# Patient Record
Sex: FEMALE | Race: WHITE | NOT HISPANIC OR LATINO | Employment: OTHER | URBAN - NONMETROPOLITAN AREA
[De-identification: names, ages, dates, MRNs, and addresses within clinical notes are randomized per-mention and may not be internally consistent; named-entity substitution may affect disease eponyms.]

---

## 2018-08-28 ENCOUNTER — CONSULT (OUTPATIENT)
Dept: CARDIOLOGY | Facility: CLINIC | Age: 63
End: 2018-08-28

## 2018-08-28 VITALS
HEIGHT: 68 IN | SYSTOLIC BLOOD PRESSURE: 154 MMHG | BODY MASS INDEX: 24.25 KG/M2 | DIASTOLIC BLOOD PRESSURE: 98 MMHG | WEIGHT: 160 LBS | HEART RATE: 62 BPM

## 2018-08-28 DIAGNOSIS — E78.00 HYPERCHOLESTEREMIA: ICD-10-CM

## 2018-08-28 DIAGNOSIS — R07.89 CHEST PRESSURE: Primary | ICD-10-CM

## 2018-08-28 DIAGNOSIS — I10 ESSENTIAL HYPERTENSION: ICD-10-CM

## 2018-08-28 DIAGNOSIS — R01.1 MURMUR, CARDIAC: ICD-10-CM

## 2018-08-28 DIAGNOSIS — R06.02 SHORTNESS OF BREATH: ICD-10-CM

## 2018-08-28 PROCEDURE — 99204 OFFICE O/P NEW MOD 45 MIN: CPT | Performed by: INTERNAL MEDICINE

## 2018-08-28 PROCEDURE — 93000 ELECTROCARDIOGRAM COMPLETE: CPT | Performed by: INTERNAL MEDICINE

## 2018-08-28 RX ORDER — VITAMIN E 268 MG
400 CAPSULE ORAL DAILY
COMMUNITY
End: 2022-07-08

## 2018-08-28 RX ORDER — OMEPRAZOLE 40 MG/1
40 CAPSULE, DELAYED RELEASE ORAL DAILY
COMMUNITY
End: 2022-03-09

## 2018-08-28 RX ORDER — LISINOPRIL 5 MG/1
5 TABLET ORAL DAILY
Qty: 30 TABLET | Refills: 11 | Status: SHIPPED | OUTPATIENT
Start: 2018-08-28 | End: 2018-10-02 | Stop reason: DRUGHIGH

## 2018-08-28 RX ORDER — ATENOLOL 50 MG/1
50 TABLET ORAL DAILY
COMMUNITY
End: 2022-04-19 | Stop reason: SDUPTHER

## 2018-08-28 NOTE — PROGRESS NOTES
CARDIAC COMPLAINTS  chest pressure/discomfort, dyspnea and fatigue      Subjective   Mila Hooper is a 62 y.o. female came in today for her follow-up visit.  She has history of hypertension, hypercholesterolemia who was seen here at our office almost 10 years ago.  Her cardiac workup done at that time showed hypertensive blood pressure response, normal LV systolic function, mild to moderate mitral regurgitation and mild pulmonary hypertension.  I have not seen her since then.  Recently she is under a lot of stress.  Her  has diffuse ischemic heart disease and [facility] and she is worried about him.  She started noticing chest pressure-like feeling in the middle part of the chest which radiates up to her neck.  It occurs mostly on exertion and relieved by taking rest.  It is associated with shortness of breath.  She denies having any palpitation, dizziness or loss of consciousness.  She used to be on Zetia for her cholesterol.  She is not sure when it was stopped.  She apparently had labs done at your office recently.  She is not a smoker.  Her father  of an aneurysm and her mother  secondary to stroke and bradycardia.    Past Surgical History:   Procedure Laterality Date   • CARDIOVASCULAR STRESS TEST  2008    9 Min. 11.0 METS. 86% THR. /96. ST depression. Negative by nuclear images.   • ECHO - CONVERTED  04/10/2008    EF 60%. Mild- Mod MR. RVSP 37 mmHg.       Current Outpatient Prescriptions   Medication Sig Dispense Refill   • atenolol (TENORMIN) 50 MG tablet Take 50 mg by mouth Daily.     • Doxylamine Succinate, Sleep, (SLEEP AID PO) Take  by mouth Every Night.     • KRILL OIL PO Take  by mouth Daily.     • omeprazole (priLOSEC) 40 MG capsule Take 40 mg by mouth Daily.     • vitamin E 400 UNIT capsule Take 400 Units by mouth Daily.     • aspirin 81 MG tablet Take 1 tablet by mouth Daily. 30 tablet 11   • lisinopril (PRINIVIL,ZESTRIL) 5 MG tablet Take 1 tablet by mouth  "Daily. 30 tablet 11     No current facility-administered medications for this visit.            ALLERGIES:  Patient has no known allergies.    Past Medical History:   Diagnosis Date   • Abnormal ECG    • Anxiety and depression    • GERD (gastroesophageal reflux disease)    • History of cosmetic surgery    • History of hand surgery    • Hypertension        History   Smoking Status   • Never Smoker   Smokeless Tobacco   • Never Used          Family History   Problem Relation Age of Onset   • Stroke Mother    • Arrhythmia Mother    • Bradycardia Mother         s/p PPM   • Aneurysm Father        Review of Systems   Constitution: Positive for malaise/fatigue. Negative for decreased appetite.   HENT: Negative for congestion and sore throat.    Eyes: Negative for blurred vision.   Cardiovascular: Positive for chest pain and dyspnea on exertion.   Respiratory: Positive for shortness of breath. Negative for snoring.    Endocrine: Negative for cold intolerance and heat intolerance.   Hematologic/Lymphatic: Negative for adenopathy. Does not bruise/bleed easily.   Skin: Negative for itching, nail changes and skin cancer.   Musculoskeletal: Negative for arthritis and myalgias.   Gastrointestinal: Negative for abdominal pain, dysphagia and heartburn.   Genitourinary: Negative for bladder incontinence and frequency.   Neurological: Negative for dizziness, light-headedness, seizures and vertigo.   Psychiatric/Behavioral: Positive for depression. Negative for altered mental status.   Allergic/Immunologic: Negative for environmental allergies and hives.       Diabetes- No  Thyroid- normal    Objective     /98 (BP Location: Right arm)   Pulse 62   Ht 172.7 cm (68\")   Wt 72.6 kg (160 lb)   BMI 24.33 kg/m²     Physical Exam   Constitutional: She is oriented to person, place, and time. She appears well-developed and well-nourished.   HENT:   Head: Normocephalic.   Nose: Nose normal.   Eyes: Pupils are equal, round, and reactive " to light. EOM are normal.   Neck: Normal range of motion. Neck supple.   Cardiovascular: Normal rate, regular rhythm, S1 normal and S2 normal.    Murmur heard.  Pulmonary/Chest: Effort normal and breath sounds normal.   Abdominal: Soft. Bowel sounds are normal.   Musculoskeletal: Normal range of motion. She exhibits no edema.   Neurological: She is alert and oriented to person, place, and time.   Skin: Skin is warm and dry.   Psychiatric: She has a normal mood and affect.         ECG 12 Lead  Date/Time: 8/28/2018 1:00 PM  Performed by: DARRELL HERMOSILLO  Authorized by: DARRELL HERMOSILLO   Previous ECG: no previous ECG available  Rhythm: sinus rhythm  Rate: normal  QRS axis: normal  Clinical impression: non-specific ECG              Assessment/Plan   Patient's Body mass index is 24.33 kg/m². BMI is within normal parameters. No follow-up required.     Mila was seen today for establish care, chest pain, cardiac history, clotting disorder, labs and aspirin.    Diagnoses and all orders for this visit:    Chest pressure  -     Stress Test With Myocardial Perfusion One Day; Future    Essential hypertension  -     lisinopril (PRINIVIL,ZESTRIL) 5 MG tablet; Take 1 tablet by mouth Daily.    Hypercholesteremia  -     Stress Test With Myocardial Perfusion One Day; Future    Murmur, cardiac  -     Adult Transthoracic Echo Complete W/ Cont if Necessary Per Protocol; Future    Shortness of breath  -     Adult Transthoracic Echo Complete W/ Cont if Necessary Per Protocol; Future       At baseline, her heart rate is stable but the blood pressure is moderately elevated.  Her EKG showed normal sinus rhythm with ST depression in the lateral leads.  Her clinical examination reveals loud second heart sound and systolic murmur at the mitral area.  Had a long talk with her about her diet.  Talked to her about cutting down on the salt intake.  I started her on low dose of ace inhibitors in the form of Zestril 5 mg once a day.  I  scheduled her to undergo an echocardiogram to reevaluate the LV function, valvular structures and the PA pressure.  She also need a stress test to evaluate her functional status, chronotropic response, blood pressure response and also to rule out stress-induced ischemia.  Based on the results, further recommendations will be made.             Electronically signed by Sury Lopez MD August 28, 2018 12:59 PM

## 2018-09-13 ENCOUNTER — HOSPITAL ENCOUNTER (OUTPATIENT)
Dept: CARDIOLOGY | Facility: HOSPITAL | Age: 63
Discharge: HOME | End: 2018-09-13
Attending: INTERNAL MEDICINE

## 2018-09-13 DIAGNOSIS — R07.89 CHEST PRESSURE: ICD-10-CM

## 2018-09-13 DIAGNOSIS — E78.00 HYPERCHOLESTEREMIA: ICD-10-CM

## 2018-09-13 DIAGNOSIS — R01.1 MURMUR, CARDIAC: ICD-10-CM

## 2018-09-13 DIAGNOSIS — R06.02 SHORTNESS OF BREATH: ICD-10-CM

## 2018-09-13 LAB
MAXIMAL PREDICTED HEART RATE: 157 BPM
MAXIMAL PREDICTED HEART RATE: 157 BPM
STRESS TARGET HR: 133 BPM
STRESS TARGET HR: 133 BPM

## 2018-09-13 PROCEDURE — 78452 HT MUSCLE IMAGE SPECT MULT: CPT

## 2018-09-13 PROCEDURE — 93018 CV STRESS TEST I&R ONLY: CPT | Performed by: INTERNAL MEDICINE

## 2018-09-13 PROCEDURE — A9500 TC99M SESTAMIBI: HCPCS | Performed by: INTERNAL MEDICINE

## 2018-09-13 PROCEDURE — 93017 CV STRESS TEST TRACING ONLY: CPT

## 2018-09-13 PROCEDURE — 0 TECHNETIUM SESTAMIBI: Performed by: INTERNAL MEDICINE

## 2018-09-13 PROCEDURE — 93306 TTE W/DOPPLER COMPLETE: CPT

## 2018-09-13 PROCEDURE — 78452 HT MUSCLE IMAGE SPECT MULT: CPT | Performed by: INTERNAL MEDICINE

## 2018-09-13 PROCEDURE — 93306 TTE W/DOPPLER COMPLETE: CPT | Performed by: INTERNAL MEDICINE

## 2018-09-13 RX ADMIN — TECHNETIUM TC 99M SESTAMIBI 1 DOSE: 1 INJECTION INTRAVENOUS at 09:45

## 2018-09-13 RX ADMIN — TECHNETIUM TC 99M SESTAMIBI 1 DOSE: 1 INJECTION INTRAVENOUS at 08:17

## 2018-09-17 ENCOUNTER — TELEPHONE (OUTPATIENT)
Dept: CARDIOLOGY | Facility: CLINIC | Age: 63
End: 2018-09-17

## 2018-09-17 NOTE — TELEPHONE ENCOUNTER
Patient aware of stress test and echo results and recommendations.  No ischemia, normal LV function, mild to moderate MR.  Continue home medications.

## 2018-09-28 ENCOUNTER — TELEPHONE (OUTPATIENT)
Dept: CARDIOLOGY | Facility: CLINIC | Age: 63
End: 2018-09-28

## 2018-09-28 NOTE — TELEPHONE ENCOUNTER
Pt has been having problems with BP, still running around 150's/ pulse in 80-90's. Meds as listed in chart.

## 2018-09-28 NOTE — TELEPHONE ENCOUNTER
After reviewing her chart.  I do not see any record of her heart rate being in the 80 or 90.  She was baseline 66 on stress and 62 at consult. SSS runs in her family.  For now, increase lisinopril to 10mg daily and continue to monitor.

## 2018-10-02 ENCOUNTER — TELEPHONE (OUTPATIENT)
Dept: CARDIOLOGY | Facility: CLINIC | Age: 63
End: 2018-10-02

## 2018-10-02 RX ORDER — LISINOPRIL 10 MG/1
10 TABLET ORAL DAILY
Qty: 90 TABLET | Refills: 3 | Status: SHIPPED | OUTPATIENT
Start: 2018-10-02 | End: 2022-03-09

## 2020-07-30 ENCOUNTER — LAB (OUTPATIENT)
Dept: LAB | Facility: HOSPITAL | Age: 65
End: 2020-07-30

## 2020-07-30 ENCOUNTER — HOSPITAL ENCOUNTER (OUTPATIENT)
Dept: GENERAL RADIOLOGY | Facility: HOSPITAL | Age: 65
Discharge: HOME | End: 2020-07-30
Admitting: ORTHOPAEDIC SURGERY

## 2020-07-30 ENCOUNTER — TRANSCRIBE ORDERS (OUTPATIENT)
Dept: LAB | Facility: HOSPITAL | Age: 65
End: 2020-07-30

## 2020-07-30 ENCOUNTER — TRANSCRIBE ORDERS (OUTPATIENT)
Dept: ADMINISTRATIVE | Facility: HOSPITAL | Age: 65
End: 2020-07-30

## 2020-07-30 DIAGNOSIS — Z01.811 PRE-OP CHEST EXAM: ICD-10-CM

## 2020-07-30 DIAGNOSIS — Z01.811 PRE-OP CHEST EXAM: Primary | ICD-10-CM

## 2020-07-30 DIAGNOSIS — Z01.818 OTHER SPECIFIED PRE-OPERATIVE EXAMINATION: Primary | ICD-10-CM

## 2020-07-30 DIAGNOSIS — Z01.818 OTHER SPECIFIED PRE-OPERATIVE EXAMINATION: ICD-10-CM

## 2020-07-30 LAB
ANION GAP SERPL CALCULATED.3IONS-SCNC: 15.7 MMOL/L (ref 5–15)
BUN SERPL-MCNC: 23 MG/DL (ref 8–23)
BUN/CREAT SERPL: 21.9 (ref 7–25)
CALCIUM SPEC-SCNC: 10.1 MG/DL (ref 8.6–10.5)
CHLORIDE SERPL-SCNC: 97 MMOL/L (ref 98–107)
CO2 SERPL-SCNC: 25.3 MMOL/L (ref 22–29)
CREAT SERPL-MCNC: 1.05 MG/DL (ref 0.57–1)
DEPRECATED RDW RBC AUTO: 45 FL (ref 37–54)
ERYTHROCYTE [DISTWIDTH] IN BLOOD BY AUTOMATED COUNT: 12.5 % (ref 12.3–15.4)
GFR SERPL CREATININE-BSD FRML MDRD: 53 ML/MIN/1.73
GLUCOSE SERPL-MCNC: 79 MG/DL (ref 65–99)
HBA1C MFR BLD: 4.85 % (ref 4.8–5.6)
HCT VFR BLD AUTO: 44.6 % (ref 34–46.6)
HGB BLD-MCNC: 15.6 G/DL (ref 12–15.9)
MCH RBC QN AUTO: 34.1 PG (ref 26.6–33)
MCHC RBC AUTO-ENTMCNC: 35 G/DL (ref 31.5–35.7)
MCV RBC AUTO: 97.6 FL (ref 79–97)
PLATELET # BLD AUTO: 211 10*3/MM3 (ref 140–450)
PMV BLD AUTO: 10.5 FL (ref 6–12)
POTASSIUM SERPL-SCNC: 3.5 MMOL/L (ref 3.5–5.2)
RBC # BLD AUTO: 4.57 10*6/MM3 (ref 3.77–5.28)
SODIUM SERPL-SCNC: 138 MMOL/L (ref 136–145)
WBC # BLD AUTO: 7.52 10*3/MM3 (ref 3.4–10.8)

## 2020-07-30 PROCEDURE — 93010 ELECTROCARDIOGRAM REPORT: CPT | Performed by: INTERNAL MEDICINE

## 2020-07-30 PROCEDURE — 36415 COLL VENOUS BLD VENIPUNCTURE: CPT | Performed by: ORTHOPAEDIC SURGERY

## 2020-07-30 PROCEDURE — 71046 X-RAY EXAM CHEST 2 VIEWS: CPT

## 2020-07-30 PROCEDURE — 80048 BASIC METABOLIC PNL TOTAL CA: CPT

## 2020-07-30 PROCEDURE — 93005 ELECTROCARDIOGRAM TRACING: CPT | Performed by: ORTHOPAEDIC SURGERY

## 2020-07-30 PROCEDURE — 83036 HEMOGLOBIN GLYCOSYLATED A1C: CPT | Performed by: ORTHOPAEDIC SURGERY

## 2020-07-30 PROCEDURE — 85027 COMPLETE CBC AUTOMATED: CPT | Performed by: ORTHOPAEDIC SURGERY

## 2020-08-10 ENCOUNTER — APPOINTMENT (OUTPATIENT)
Dept: PREADMISSION TESTING | Facility: HOSPITAL | Age: 65
End: 2020-08-10

## 2020-08-10 PROCEDURE — U0002 COVID-19 LAB TEST NON-CDC: HCPCS

## 2020-08-10 PROCEDURE — C9803 HOPD COVID-19 SPEC COLLECT: HCPCS

## 2020-08-10 PROCEDURE — U0004 COV-19 TEST NON-CDC HGH THRU: HCPCS

## 2020-08-11 LAB
REF LAB TEST METHOD: NORMAL
SARS-COV-2 RNA RESP QL NAA+PROBE: NOT DETECTED

## 2022-03-09 ENCOUNTER — OFFICE VISIT (OUTPATIENT)
Dept: CARDIOLOGY | Facility: CLINIC | Age: 67
End: 2022-03-09

## 2022-03-09 VITALS
SYSTOLIC BLOOD PRESSURE: 163 MMHG | WEIGHT: 166.4 LBS | HEART RATE: 70 BPM | DIASTOLIC BLOOD PRESSURE: 88 MMHG | BODY MASS INDEX: 26.12 KG/M2 | HEIGHT: 67 IN | OXYGEN SATURATION: 98 %

## 2022-03-09 DIAGNOSIS — R10.13 EPIGASTRIC PAIN: ICD-10-CM

## 2022-03-09 DIAGNOSIS — R07.89 CHEST PRESSURE: ICD-10-CM

## 2022-03-09 DIAGNOSIS — R00.2 PALPITATIONS: ICD-10-CM

## 2022-03-09 DIAGNOSIS — I10 ESSENTIAL HYPERTENSION: Primary | ICD-10-CM

## 2022-03-09 DIAGNOSIS — R06.02 SHORTNESS OF BREATH: ICD-10-CM

## 2022-03-09 DIAGNOSIS — R06.02 SOB (SHORTNESS OF BREATH): ICD-10-CM

## 2022-03-09 PROCEDURE — 93000 ELECTROCARDIOGRAM COMPLETE: CPT | Performed by: NURSE PRACTITIONER

## 2022-03-09 PROCEDURE — 99204 OFFICE O/P NEW MOD 45 MIN: CPT | Performed by: NURSE PRACTITIONER

## 2022-03-09 RX ORDER — OXYBUTYNIN CHLORIDE 5 MG/1
5 TABLET, EXTENDED RELEASE ORAL DAILY
COMMUNITY
Start: 2022-01-19

## 2022-03-09 RX ORDER — PANTOPRAZOLE SODIUM 40 MG/1
40 TABLET, DELAYED RELEASE ORAL DAILY
COMMUNITY
End: 2022-07-08

## 2022-03-09 RX ORDER — AMLODIPINE BESYLATE 5 MG/1
5 TABLET ORAL DAILY
Qty: 90 TABLET | Refills: 3 | Status: SHIPPED | OUTPATIENT
Start: 2022-03-09 | End: 2022-03-15 | Stop reason: SINTOL

## 2022-03-09 RX ORDER — IRBESARTAN AND HYDROCHLOROTHIAZIDE 300; 12.5 MG/1; MG/1
1 TABLET, FILM COATED ORAL DAILY
Qty: 30 TABLET | Refills: 0 | Status: SHIPPED | OUTPATIENT
Start: 2022-03-09 | End: 2022-03-09 | Stop reason: SDUPTHER

## 2022-03-09 RX ORDER — IRBESARTAN AND HYDROCHLOROTHIAZIDE 300; 12.5 MG/1; MG/1
1 TABLET, FILM COATED ORAL DAILY
Qty: 90 TABLET | Refills: 3 | Status: SHIPPED | OUTPATIENT
Start: 2022-03-09 | End: 2022-03-15 | Stop reason: SINTOL

## 2022-03-09 RX ORDER — NITROGLYCERIN 0.4 MG/1
TABLET SUBLINGUAL
Qty: 30 TABLET | Refills: 5 | Status: SHIPPED | OUTPATIENT
Start: 2022-03-09 | End: 2022-07-11

## 2022-03-09 RX ORDER — FAMOTIDINE 20 MG/1
20 TABLET, FILM COATED ORAL DAILY
COMMUNITY
Start: 2022-01-19

## 2022-03-09 RX ORDER — AMLODIPINE BESYLATE 5 MG/1
5 TABLET ORAL DAILY
Qty: 30 TABLET | Refills: 0 | Status: SHIPPED | OUTPATIENT
Start: 2022-03-09 | End: 2022-03-09 | Stop reason: SDUPTHER

## 2022-03-09 RX ORDER — CLONIDINE HYDROCHLORIDE 0.1 MG/1
0.1 TABLET ORAL 3 TIMES DAILY PRN
Qty: 90 TABLET | Refills: 3 | Status: SHIPPED | OUTPATIENT
Start: 2022-03-09 | End: 2022-06-06

## 2022-03-09 NOTE — PROGRESS NOTES
Subjective     Mila Hooper is a 66 y.o. female who presents to day for Chest Pain (Presents for cardiac eval), Palpitations, Hypertension, and Shortness of Breath.    CHIEF COMPLIANT  Chief Complaint   Patient presents with   • Chest Pain     Presents for cardiac eval   • Palpitations   • Hypertension   • Shortness of Breath       Active Problems:  Problem List Items Addressed This Visit        Cardiac and Vasculature    Essential hypertension - Primary    Relevant Medications    cloNIDine (Catapres) 0.1 MG tablet    amLODIPine (NORVASC) 5 MG tablet    irbesartan-hydrochlorothiazide (AVALIDE) 300-12.5 MG tablet    Other Relevant Orders    ECG 12 Lead    Stress Test With Myocardial Perfusion One Day    Adult Transthoracic Echo Complete W/ Cont if Necessary Per Protocol    US Abdomen Complete       Pulmonary and Pneumonias    Shortness of breath    Relevant Orders    ECG 12 Lead    Stress Test With Myocardial Perfusion One Day    Adult Transthoracic Echo Complete W/ Cont if Necessary Per Protocol    US Abdomen Complete       Symptoms and Signs    Chest pressure    Relevant Medications    nitroglycerin (NITROSTAT) 0.4 MG SL tablet    Other Relevant Orders    ECG 12 Lead    Stress Test With Myocardial Perfusion One Day    Adult Transthoracic Echo Complete W/ Cont if Necessary Per Protocol    US Abdomen Complete      Other Visit Diagnoses     SOB (shortness of breath)        Relevant Orders    ECG 12 Lead    Stress Test With Myocardial Perfusion One Day    Adult Transthoracic Echo Complete W/ Cont if Necessary Per Protocol    US Abdomen Complete    Palpitations        Relevant Orders    ECG 12 Lead    Stress Test With Myocardial Perfusion One Day    Adult Transthoracic Echo Complete W/ Cont if Necessary Per Protocol    US Abdomen Complete    Epigastric pain        Relevant Orders    ECG 12 Lead    US Abdomen Complete      Problem list  1.  Chest pain  2.  Hypertension  3.  Shortness of breath  4.   Palpitations  5.  COVID-19    HPI  HPI  Ms. Hooper is a 66-year-old female patient who is being seen today to establish care for cardiac evaluation for chest pain and hypertension.  Patient reports chest pain that occurs in the epigastric area and radiates to the mid sternum and through to her back.  Last episode occurred last night.  It occurs intermittently and which she describes as a dull-like sensation in her chest.  Usually last for 15 to 20 minutes per episode.  Usually does occur with activity but can occur at rest.  She also has associated added nausea.  She is not been able to identify any relieving factors.  She has been taking aspirin as needed.  She does have a sister at 53 and a brother is 72 had cardiovascular issues.  She also reports shortness of breath that occurs with activity and at rest.  She is walking a minimal distance she has to stop and catch her breath.  This is also progressed.  She also has palpitations which is intermittent fluttering and jumping and flip-flopping in her chest that occurs approximately 1-2 times a week.  She says it sort of just hits and goes away and is short-lived.  She is on atenolol 50 mg daily which seems to have helped quite a bit.  She also has fatigue where she gets tired and stays tired.  She has had COVID-19 x2.  She also has some lower extremity edema where she experiences some puffiness around her ankles.  She does have chronic arterial hypertension where she is on atenolol and lisinopril.  She is currently 163/88 with a heart rate is 70.  She says it usually quite a bit higher than that.  She does have some associated dizziness.  She denies any syncope, PND, orthopnea, or strokelike symptoms.  PRIOR MEDS  Current Outpatient Medications on File Prior to Visit   Medication Sig Dispense Refill   • aspirin 81 MG tablet Take 1 tablet by mouth Daily. 30 tablet 11   • atenolol (TENORMIN) 50 MG tablet Take 50 mg by mouth Daily.     • Doxylamine Succinate, Sleep,  (SLEEP AID PO) Take  by mouth Every Night.     • famotidine (PEPCID) 20 MG tablet Take 20 mg by mouth Daily.     • oxybutynin XL (DITROPAN-XL) 5 MG 24 hr tablet Take 5 mg by mouth Daily.     • pantoprazole (PROTONIX) 40 MG EC tablet Take 40 mg by mouth Daily.     • vitamin E 400 UNIT capsule Take 400 Units by mouth Daily.       No current facility-administered medications on file prior to visit.       ALLERGIES  Patient has no known allergies.    HISTORY  Past Medical History:   Diagnosis Date   • Abnormal ECG    • Anxiety and depression    • COVID-19     x2   • GERD (gastroesophageal reflux disease)    • History of cosmetic surgery    • History of hand surgery    • Hypertension        Social History     Socioeconomic History   • Marital status:    Tobacco Use   • Smoking status: Never Smoker   • Smokeless tobacco: Never Used   Substance and Sexual Activity   • Alcohol use: No   • Drug use: No       Family History   Problem Relation Age of Onset   • Stroke Mother    • Arrhythmia Mother    • Bradycardia Mother         s/p PPM   • Aneurysm Father        Review of Systems   Constitutional: Positive for fatigue (Covid x2). Negative for chills, diaphoresis and fever.   HENT: Negative.    Eyes: Negative.  Negative for visual disturbance.   Respiratory: Positive for shortness of breath (with activity and times at rest). Negative for apnea, cough, chest tightness and wheezing.    Cardiovascular: Positive for chest pain (pain in pit of stomach up to chest and around to back), palpitations (flutters) and leg swelling (puffiness at sock line).   Gastrointestinal: Positive for constipation. Negative for abdominal pain, blood in stool, diarrhea, nausea and vomiting.   Endocrine: Negative.    Genitourinary: Negative.  Negative for hematuria.   Musculoskeletal: Positive for arthralgias, back pain and neck pain. Negative for myalgias.   Skin: Negative.    Allergic/Immunologic: Negative.    Neurological: Positive for  "dizziness (with HTN). Negative for syncope, weakness, light-headedness, numbness and headaches.   Hematological: Negative.  Does not bruise/bleed easily.   Psychiatric/Behavioral: Positive for sleep disturbance (restless).       Objective     VITALS: /88 (BP Location: Left arm, Patient Position: Sitting)   Pulse 70   Ht 170.2 cm (67\")   Wt 75.5 kg (166 lb 6.4 oz)   SpO2 98%   BMI 26.06 kg/m²     LABS:   Lab Results (most recent)     None          IMAGING:   No Images in the past 120 days found..    EXAM:  Physical Exam  Vitals and nursing note reviewed.   Constitutional:       Appearance: She is well-developed.   HENT:      Head: Normocephalic.   Eyes:      Pupils: Pupils are equal, round, and reactive to light.   Neck:      Thyroid: No thyroid mass.      Vascular: No carotid bruit or JVD.      Trachea: Trachea and phonation normal.   Cardiovascular:      Rate and Rhythm: Normal rate and regular rhythm.      Pulses:           Radial pulses are 2+ on the right side and 2+ on the left side.      Heart sounds: Murmur heard.     No friction rub. No gallop.   Pulmonary:      Effort: Pulmonary effort is normal. No respiratory distress.      Breath sounds: Normal breath sounds. No wheezing or rales.   Abdominal:      General: Bowel sounds are normal.      Palpations: Abdomen is soft.   Musculoskeletal:         General: Normal range of motion.      Cervical back: Neck supple.   Skin:     General: Skin is warm and dry.      Capillary Refill: Capillary refill takes less than 2 seconds.      Findings: No rash.   Neurological:      Mental Status: She is alert and oriented to person, place, and time.   Psychiatric:         Speech: Speech normal.         Behavior: Behavior normal.         Thought Content: Thought content normal.         Judgment: Judgment normal.         Procedure     ECG 12 Lead    Date/Time: 3/9/2022 9:58 AM  Performed by: Ramiro Brito APRN  Authorized by: Ramiro Brito APRN   Comparison: " compared with previous ECG from 7/30/2020  Similar to previous ECG  Rate: normal  BPM: 67  QRS axis: normal  Other findings: non-specific ST-T wave changes  Comments:  ms  No acute changes               Assessment/Plan    Diagnosis Plan   1. Essential hypertension  ECG 12 Lead    cloNIDine (Catapres) 0.1 MG tablet    Stress Test With Myocardial Perfusion One Day    Adult Transthoracic Echo Complete W/ Cont if Necessary Per Protocol    US Abdomen Complete    amLODIPine (NORVASC) 5 MG tablet    irbesartan-hydrochlorothiazide (AVALIDE) 300-12.5 MG tablet   2. Shortness of breath  ECG 12 Lead    Stress Test With Myocardial Perfusion One Day    Adult Transthoracic Echo Complete W/ Cont if Necessary Per Protocol    US Abdomen Complete   3. Chest pressure  ECG 12 Lead    nitroglycerin (NITROSTAT) 0.4 MG SL tablet    Stress Test With Myocardial Perfusion One Day    Adult Transthoracic Echo Complete W/ Cont if Necessary Per Protocol    US Abdomen Complete   4. SOB (shortness of breath)  ECG 12 Lead    Stress Test With Myocardial Perfusion One Day    Adult Transthoracic Echo Complete W/ Cont if Necessary Per Protocol    US Abdomen Complete   5. Palpitations  ECG 12 Lead    Stress Test With Myocardial Perfusion One Day    Adult Transthoracic Echo Complete W/ Cont if Necessary Per Protocol    US Abdomen Complete   6. Epigastric pain  ECG 12 Lead    US Abdomen Complete   1.  Patient does have an elevated blood pressure today and a history of elevated blood pressure per her report.  She is currently on lisinopril and atenolol.  We will also start her on amlodipine to see if we can better control her blood pressure.  She will monitor her blood pressure on a routine basis report any significant highs or lows.  I would also like to switch her lisinopril to irbesartan hydrochlorothiazide.  2.  Due to patient's chest pain, shortness of breath, and continued fatigue as well as COVID-19 and heart murmur I do feel it appropriate  to have patient go under an ischemic evaluation including stress test and echocardiogram.  She does have significant comorbidities as well of hypertension, strong family history of heart disease, and nonspecific changes on her EKG.  3.  Patient does have a family history of aortic aneurysm.  Therefore I like to get an ultrasound of her abdominal aorta for evaluation due to the fact that her pain originates in the epigastric area and radiates into her back.  4.  Patient was prescribed sublingual nitroglycerin and advised he can take up to 3 doses 5 minutes apart and if pain is not relieved after the third dose go to the emergency department.  5.  Informed of signs and symptoms of ACS and advised to seek emergent treatment for any new worsening symptoms.  Patient also advised sooner follow-up as needed.  Also advised to follow-up with family doctor as needed  This note is dictated utilizing voice recognition software.  Although this record has been proof read, transcriptional errors may still be present. If questions occur regarding the content of this record please do not hesitate to call our office.  I have reviewed and confirmed the accuracy of the ROS as documented by the MA/JOSEN/RN TRACI Chand    Return in about 3 months (around 6/9/2022), or if symptoms worsen or fail to improve.    Diagnoses and all orders for this visit:    1. Essential hypertension (Primary)  -     ECG 12 Lead  -     Discontinue: irbesartan-hydrochlorothiazide (AVALIDE) 300-12.5 MG tablet; Take 1 tablet by mouth Daily.  Dispense: 30 tablet; Refill: 0  -     Discontinue: amLODIPine (NORVASC) 5 MG tablet; Take 1 tablet by mouth Daily.  Dispense: 30 tablet; Refill: 0  -     cloNIDine (Catapres) 0.1 MG tablet; Take 1 tablet by mouth 3 (Three) Times a Day As Needed for High Blood Pressure (>160/90).  Dispense: 90 tablet; Refill: 3  -     Stress Test With Myocardial Perfusion One Day; Future  -     Adult Transthoracic Echo Complete W/ Cont  if Necessary Per Protocol; Future  -     US Abdomen Complete; Future  -     amLODIPine (NORVASC) 5 MG tablet; Take 1 tablet by mouth Daily.  Dispense: 90 tablet; Refill: 3  -     irbesartan-hydrochlorothiazide (AVALIDE) 300-12.5 MG tablet; Take 1 tablet by mouth Daily.  Dispense: 90 tablet; Refill: 3    2. Shortness of breath  -     ECG 12 Lead  -     Stress Test With Myocardial Perfusion One Day; Future  -     Adult Transthoracic Echo Complete W/ Cont if Necessary Per Protocol; Future  -     US Abdomen Complete; Future    3. Chest pressure  -     ECG 12 Lead  -     nitroglycerin (NITROSTAT) 0.4 MG SL tablet; 1 under the tongue as needed for angina, may repeat q5mins for up three doses  Dispense: 30 tablet; Refill: 5  -     Stress Test With Myocardial Perfusion One Day; Future  -     Adult Transthoracic Echo Complete W/ Cont if Necessary Per Protocol; Future  -     US Abdomen Complete; Future    4. SOB (shortness of breath)  -     ECG 12 Lead  -     Stress Test With Myocardial Perfusion One Day; Future  -     Adult Transthoracic Echo Complete W/ Cont if Necessary Per Protocol; Future  -     US Abdomen Complete; Future    5. Palpitations  -     ECG 12 Lead  -     Stress Test With Myocardial Perfusion One Day; Future  -     Adult Transthoracic Echo Complete W/ Cont if Necessary Per Protocol; Future  -     US Abdomen Complete; Future    6. Epigastric pain  -     ECG 12 Lead  -     US Abdomen Complete; Future          Advance Care Planning   ACP discussion was held with the patient during this visit. Patient has an advance directive (not in EMR), copy requested.       MEDS ORDERED DURING VISIT:  New Medications Ordered This Visit   Medications   • nitroglycerin (NITROSTAT) 0.4 MG SL tablet     Si under the tongue as needed for angina, may repeat q5mins for up three doses     Dispense:  30 tablet     Refill:  5   • cloNIDine (Catapres) 0.1 MG tablet     Sig: Take 1 tablet by mouth 3 (Three) Times a Day As Needed for  High Blood Pressure (>160/90).     Dispense:  90 tablet     Refill:  3   • amLODIPine (NORVASC) 5 MG tablet     Sig: Take 1 tablet by mouth Daily.     Dispense:  90 tablet     Refill:  3   • irbesartan-hydrochlorothiazide (AVALIDE) 300-12.5 MG tablet     Sig: Take 1 tablet by mouth Daily.     Dispense:  90 tablet     Refill:  3           This document has been electronically signed by Ramiro Brito Jr., APRN  March 11, 2022 07:55 EST

## 2022-03-15 ENCOUNTER — CLINICAL SUPPORT (OUTPATIENT)
Dept: CARDIOLOGY | Facility: CLINIC | Age: 67
End: 2022-03-15

## 2022-03-15 VITALS
WEIGHT: 161 LBS | OXYGEN SATURATION: 99 % | DIASTOLIC BLOOD PRESSURE: 84 MMHG | SYSTOLIC BLOOD PRESSURE: 132 MMHG | HEIGHT: 67 IN | BODY MASS INDEX: 25.27 KG/M2 | HEART RATE: 60 BPM

## 2022-03-15 DIAGNOSIS — R10.13 EPIGASTRIC PAIN: ICD-10-CM

## 2022-03-15 DIAGNOSIS — I10 ESSENTIAL HYPERTENSION: ICD-10-CM

## 2022-03-15 DIAGNOSIS — R07.89 CHEST PRESSURE: ICD-10-CM

## 2022-03-15 DIAGNOSIS — I10 ESSENTIAL HYPERTENSION: Primary | ICD-10-CM

## 2022-03-15 DIAGNOSIS — R10.13 EPIGASTRIC PAIN: Primary | ICD-10-CM

## 2022-03-15 DIAGNOSIS — Z82.49 FAMILY HISTORY OF ABDOMINAL AORTIC ANEURYSM (AAA): ICD-10-CM

## 2022-03-15 DIAGNOSIS — R00.2 PALPITATIONS: ICD-10-CM

## 2022-03-15 DIAGNOSIS — Z82.49 FAMILY HISTORY OF ABDOMINAL AORTIC ANEURYSM: ICD-10-CM

## 2022-03-15 PROCEDURE — 99214 OFFICE O/P EST MOD 30 MIN: CPT | Performed by: NURSE PRACTITIONER

## 2022-03-15 RX ORDER — ISOSORBIDE MONONITRATE 30 MG/1
30 TABLET, EXTENDED RELEASE ORAL DAILY
Qty: 30 TABLET | Refills: 11 | Status: SHIPPED | OUTPATIENT
Start: 2022-03-15 | End: 2022-06-20

## 2022-03-15 RX ORDER — ISOSORBIDE MONONITRATE 30 MG/1
30 TABLET, EXTENDED RELEASE ORAL DAILY
Qty: 30 TABLET | Refills: 11 | Status: SHIPPED | OUTPATIENT
Start: 2022-03-15 | End: 2022-03-15

## 2022-03-15 NOTE — PROGRESS NOTES
Mila Hooper  1955  3/15/2022   ?   Chief Complaint   Patient presents with   • Chest Pain   • Fatigue     presyncope   • Shortness of Breath      ?   HPI:   ?Patient presents with chest pain,fatigue, and presyncope. She said that her hear rate was as high as 145 on Sunday.     ?   ?     Current Outpatient Medications:   •  aspirin 81 MG tablet, Take 1 tablet by mouth Daily., Disp: 30 tablet, Rfl: 11  •  atenolol (TENORMIN) 50 MG tablet, Take 50 mg by mouth Daily., Disp: , Rfl:   •  Doxylamine Succinate, Sleep, (SLEEP AID PO), Take  by mouth Every Night., Disp: , Rfl:   •  famotidine (PEPCID) 20 MG tablet, Take 20 mg by mouth Daily., Disp: , Rfl:   •  nitroglycerin (NITROSTAT) 0.4 MG SL tablet, 1 under the tongue as needed for angina, may repeat q5mins for up three doses, Disp: 30 tablet, Rfl: 5  •  oxybutynin XL (DITROPAN-XL) 5 MG 24 hr tablet, Take 5 mg by mouth Daily., Disp: , Rfl:   •  pantoprazole (PROTONIX) 40 MG EC tablet, Take 40 mg by mouth Daily., Disp: , Rfl:   •  vitamin E 400 UNIT capsule, Take 400 Units by mouth Daily., Disp: , Rfl:   •  amLODIPine (NORVASC) 5 MG tablet, Take 1 tablet by mouth Daily., Disp: 90 tablet, Rfl: 3  •  cloNIDine (Catapres) 0.1 MG tablet, Take 1 tablet by mouth 3 (Three) Times a Day As Needed for High Blood Pressure (>160/90)., Disp: 90 tablet, Rfl: 3  •  irbesartan-hydrochlorothiazide (AVALIDE) 300-12.5 MG tablet, Take 1 tablet by mouth Daily., Disp: 90 tablet, Rfl: 3   ?   ?   Patient has no known allergies.       Procedures     ?   Assessment/Plan    ?   ?   ?

## 2022-03-24 ENCOUNTER — HOSPITAL ENCOUNTER (OUTPATIENT)
Dept: CARDIOLOGY | Facility: HOSPITAL | Age: 67
Discharge: HOME | End: 2022-03-24

## 2022-03-24 DIAGNOSIS — Z82.49 FAMILY HISTORY OF ABDOMINAL AORTIC ANEURYSM: ICD-10-CM

## 2022-03-24 DIAGNOSIS — R10.13 EPIGASTRIC PAIN: ICD-10-CM

## 2022-03-24 DIAGNOSIS — R07.89 CHEST PRESSURE: ICD-10-CM

## 2022-03-24 DIAGNOSIS — R06.02 SOB (SHORTNESS OF BREATH): ICD-10-CM

## 2022-03-24 DIAGNOSIS — I10 ESSENTIAL HYPERTENSION: ICD-10-CM

## 2022-03-24 DIAGNOSIS — R06.02 SHORTNESS OF BREATH: ICD-10-CM

## 2022-03-24 DIAGNOSIS — Z82.49 FAMILY HISTORY OF ABDOMINAL AORTIC ANEURYSM (AAA): ICD-10-CM

## 2022-03-24 DIAGNOSIS — R00.2 PALPITATIONS: ICD-10-CM

## 2022-03-24 LAB
BH CV ECHO MEAS - DIST AO DIAM: 1.7 CM
BH CV XLRA MEAS - MID AO DIAM: 2 CM
BH CV XLRA MEAS - PROX AO DIAM: 2.3 CM

## 2022-03-24 PROCEDURE — 93975 VASCULAR STUDY: CPT

## 2022-03-24 PROCEDURE — 93306 TTE W/DOPPLER COMPLETE: CPT

## 2022-03-24 PROCEDURE — A9500 TC99M SESTAMIBI: HCPCS | Performed by: INTERNAL MEDICINE

## 2022-03-24 PROCEDURE — 93975 VASCULAR STUDY: CPT | Performed by: INTERNAL MEDICINE

## 2022-03-24 PROCEDURE — 93799 UNLISTED CV SVC/PROCEDURE: CPT

## 2022-03-24 PROCEDURE — 93017 CV STRESS TEST TRACING ONLY: CPT

## 2022-03-24 PROCEDURE — 93306 TTE W/DOPPLER COMPLETE: CPT | Performed by: INTERNAL MEDICINE

## 2022-03-24 PROCEDURE — 0 TECHNETIUM SESTAMIBI: Performed by: INTERNAL MEDICINE

## 2022-03-24 PROCEDURE — 93018 CV STRESS TEST I&R ONLY: CPT | Performed by: INTERNAL MEDICINE

## 2022-03-24 PROCEDURE — 78452 HT MUSCLE IMAGE SPECT MULT: CPT | Performed by: INTERNAL MEDICINE

## 2022-03-24 PROCEDURE — 78452 HT MUSCLE IMAGE SPECT MULT: CPT

## 2022-03-24 RX ADMIN — TECHNETIUM TC 99M SESTAMIBI 1 DOSE: 1 INJECTION INTRAVENOUS at 10:07

## 2022-03-24 RX ADMIN — TECHNETIUM TC 99M SESTAMIBI 1 DOSE: 1 INJECTION INTRAVENOUS at 13:41

## 2022-03-25 ENCOUNTER — TELEPHONE (OUTPATIENT)
Dept: CARDIOLOGY | Facility: CLINIC | Age: 67
End: 2022-03-25

## 2022-03-25 ENCOUNTER — APPOINTMENT (OUTPATIENT)
Dept: CARDIOLOGY | Facility: HOSPITAL | Age: 67
End: 2022-03-25

## 2022-03-25 DIAGNOSIS — I77.810 DILATED AORTIC ROOT: Primary | ICD-10-CM

## 2022-03-25 DIAGNOSIS — I77.810 DILATED AORTIC ROOT: ICD-10-CM

## 2022-03-25 LAB
BH CV ECHO MEAS - ACS: 2.18 CM
BH CV ECHO MEAS - AO MAX PG: 4.1 MMHG
BH CV ECHO MEAS - AO MEAN PG: 2.31 MMHG
BH CV ECHO MEAS - AO ROOT DIAM: 3.2 CM
BH CV ECHO MEAS - AO V2 MAX: 101.8 CM/SEC
BH CV ECHO MEAS - AO V2 VTI: 25.9 CM
BH CV ECHO MEAS - EDV(CUBED): 61.2 ML
BH CV ECHO MEAS - EDV(MOD-SP4): 75.2 ML
BH CV ECHO MEAS - EF(MOD-SP4): 53.3 %
BH CV ECHO MEAS - EF_3D-VOL: 59 %
BH CV ECHO MEAS - ESV(CUBED): 8.9 ML
BH CV ECHO MEAS - ESV(MOD-SP4): 35.1 ML
BH CV ECHO MEAS - FS: 47.5 %
BH CV ECHO MEAS - IVS/LVPW: 1.69 CM
BH CV ECHO MEAS - IVSD: 1.84 CM
BH CV ECHO MEAS - LA DIMENSION: 4.2 CM
BH CV ECHO MEAS - LAT PEAK E' VEL: 7.4 CM/SEC
BH CV ECHO MEAS - LV DIASTOLIC VOL/BSA (35-75): 40.8 CM2
BH CV ECHO MEAS - LV MASS(C)D: 219.5 GRAMS
BH CV ECHO MEAS - LV SYSTOLIC VOL/BSA (12-30): 19 CM2
BH CV ECHO MEAS - LVIDD: 3.9 CM
BH CV ECHO MEAS - LVIDS: 2.07 CM
BH CV ECHO MEAS - LVOT AREA: 2.8 CM2
BH CV ECHO MEAS - LVOT DIAM: 1.9 CM
BH CV ECHO MEAS - LVPWD: 1.09 CM
BH CV ECHO MEAS - MED PEAK E' VEL: 4.9 CM/SEC
BH CV ECHO MEAS - MV A MAX VEL: 58.7 CM/SEC
BH CV ECHO MEAS - MV DEC SLOPE: 233.5 CM/SEC2
BH CV ECHO MEAS - MV E MAX VEL: 65.6 CM/SEC
BH CV ECHO MEAS - MV E/A: 1.12
BH CV ECHO MEAS - RAP SYSTOLE: 10 MMHG
BH CV ECHO MEAS - RVDD: 3.4 CM
BH CV ECHO MEAS - RVSP: 31 MMHG
BH CV ECHO MEAS - SI(MOD-SP4): 21.7 ML/M2
BH CV ECHO MEAS - SV(MOD-SP4): 40.1 ML
BH CV ECHO MEAS - TR MAX PG: 21 MMHG
BH CV ECHO MEAS - TR MAX VEL: 229.4 CM/SEC
BH CV ECHO MEASUREMENTS AVERAGE E/E' RATIO: 10.67
LEFT ATRIUM VOLUME INDEX: 33.9 ML/M2
MAXIMAL PREDICTED HEART RATE: 153 BPM
STRESS TARGET HR: 130 BPM

## 2022-03-27 LAB
BH CV REST NUCLEAR ISOTOPE DOSE: 10 MCI
BH CV STRESS NUCLEAR ISOTOPE DOSE: 30 MCI
BH CV STRESS RECOVERY BP: NORMAL MMHG
BH CV STRESS RECOVERY HR: 64 BPM
MAXIMAL PREDICTED HEART RATE: 153 BPM
PERCENT MAX PREDICTED HR: 83.01 %
STRESS BASELINE BP: NORMAL MMHG
STRESS BASELINE HR: 53 BPM
STRESS PERCENT HR: 98 %
STRESS POST ESTIMATED WORKLOAD: 9.5 METS
STRESS POST EXERCISE DUR MIN: 7 MIN
STRESS POST EXERCISE DUR SEC: 45 SEC
STRESS POST PEAK BP: NORMAL MMHG
STRESS POST PEAK HR: 127 BPM
STRESS TARGET HR: 130 BPM

## 2022-03-28 ENCOUNTER — TELEPHONE (OUTPATIENT)
Dept: CARDIOLOGY | Facility: CLINIC | Age: 67
End: 2022-03-28

## 2022-03-28 PROCEDURE — 93000 ELECTROCARDIOGRAM COMPLETE: CPT | Performed by: NURSE PRACTITIONER

## 2022-03-28 NOTE — TELEPHONE ENCOUNTER
Patient came into the office today asking to speak with  regarding some testing she had done last week, she was told she needed to go to the hospital because of a tear. , could you please call this patient to go over this? Thank you! [phone]

## 2022-03-28 NOTE — PROGRESS NOTES
Subjective     Mila Hooper is a 66 y.o. female who presents to day for Chest Pain, Fatigue (presyncope), and Shortness of Breath.    CHIEF COMPLIANT  Chief Complaint   Patient presents with   • Chest Pain   • Fatigue     presyncope   • Shortness of Breath       Active Problems:  Problem List Items Addressed This Visit        Cardiac and Vasculature    Essential hypertension    Relevant Orders    Holter Monitor - 24 Hour       Symptoms and Signs    Chest pressure    Relevant Medications    isosorbide mononitrate (IMDUR) 30 MG 24 hr tablet    Other Relevant Orders    Holter Monitor - 24 Hour      Other Visit Diagnoses     Epigastric pain    -  Primary    Relevant Orders    Holter Monitor - 24 Hour    Palpitations        Relevant Orders    Holter Monitor - 24 Hour    Family history of abdominal aortic aneurysm (AAA)        Relevant Orders    Holter Monitor - 24 Hour           Problem list  1.  Chest pain  2.  Hypertension  3.  Shortness of breath  4.  Palpitations  5.  COVID-19      HPI  HPI  Ms. Hooper is a 66-year-old female patient who is being seen today for chest pain, shortness of breath, and presyncope.  Patient reports that she developed chest pain in the right side of her chest that radiated around to her back.  Says both of her arms ache all the time.  She reports Sunday that her heart started racing she bent over to  the grandkids and became diaphoretic and near syncopal.  She said her heart rate was elevated to 130s to 140s.  She said her blood pressure was in the 147 systolic.  She had associated nausea and shortness of breath.  She felt this was associated with her medications.  She did take nitroglycerin x2 when her heart was racing which gave her headache and that is when she became presyncopal.  The symptoms relieved and she felt like she was jerking inside.  She does have shortness of breath that occurs with exertion such as taking a shower.  Patient does have chronic arterial  hypertension in which she is currently being treated with atenolol 50 mg daily.  Today her blood pressure is well controlled at 132/84.  She did provide a log that also showed that her blood pressure is predominantly controlled.  This will be scanned in the chart for further reference.  She denied any PND, orthopnea, or other strokelike symptoms.          PRIOR MEDS  Current Outpatient Medications on File Prior to Visit   Medication Sig Dispense Refill   • aspirin 81 MG tablet Take 1 tablet by mouth Daily. 30 tablet 11   • atenolol (TENORMIN) 50 MG tablet Take 50 mg by mouth Daily.     • Doxylamine Succinate, Sleep, (SLEEP AID PO) Take  by mouth Every Night.     • famotidine (PEPCID) 20 MG tablet Take 20 mg by mouth Daily.     • nitroglycerin (NITROSTAT) 0.4 MG SL tablet 1 under the tongue as needed for angina, may repeat q5mins for up three doses 30 tablet 5   • oxybutynin XL (DITROPAN-XL) 5 MG 24 hr tablet Take 5 mg by mouth Daily.     • pantoprazole (PROTONIX) 40 MG EC tablet Take 40 mg by mouth Daily.     • vitamin E 400 UNIT capsule Take 400 Units by mouth Daily.     • cloNIDine (Catapres) 0.1 MG tablet Take 1 tablet by mouth 3 (Three) Times a Day As Needed for High Blood Pressure (>160/90). 90 tablet 3     No current facility-administered medications on file prior to visit.       ALLERGIES  Avalide [irbesartan-hydrochlorothiazide]    HISTORY  Past Medical History:   Diagnosis Date   • Abnormal ECG    • Anxiety and depression    • COVID-19     x2   • GERD (gastroesophageal reflux disease)    • History of cosmetic surgery    • History of hand surgery    • Hypertension        Social History     Socioeconomic History   • Marital status:    Tobacco Use   • Smoking status: Never Smoker   • Smokeless tobacco: Never Used   Substance and Sexual Activity   • Alcohol use: No   • Drug use: No       Family History   Problem Relation Age of Onset   • Stroke Mother    • Arrhythmia Mother    • Bradycardia Mother         " s/p PPM   • Aneurysm Father        Review of Systems   Constitutional: Positive for fatigue.   Eyes: Negative.    Respiratory: Positive for shortness of breath.    Cardiovascular: Positive for chest pain, palpitations and leg swelling.   Endocrine: Negative.    Musculoskeletal: Negative.    Allergic/Immunologic: Negative.    Neurological: Positive for dizziness and syncope ( Near syncope).   Hematological: Negative.    Psychiatric/Behavioral: Negative for sleep disturbance.       Objective     VITALS: /84 (BP Location: Left arm, Patient Position: Sitting)   Pulse 60   Ht 170.2 cm (67.01\")   Wt 73 kg (161 lb)   SpO2 99%   BMI 25.21 kg/m²     LABS:   Lab Results (most recent)     None          IMAGING:   Vascular Screening (Aortic Aneurysm) CAR    Result Date: 3/24/2022  No evidence of an abdominal aortic aneurysm on today's study.  This report was finalized on 3/24/2022 11:08 AM by Dr. Ramin Ly MD.        EXAM:  Physical Exam  Vitals and nursing note reviewed.   Constitutional:       Appearance: She is well-developed.   HENT:      Head: Normocephalic.   Eyes:      Pupils: Pupils are equal, round, and reactive to light.   Neck:      Thyroid: No thyroid mass.      Vascular: No carotid bruit or JVD.      Trachea: Trachea and phonation normal.   Cardiovascular:      Rate and Rhythm: Normal rate and regular rhythm.      Pulses:           Radial pulses are 2+ on the right side and 2+ on the left side.        Posterior tibial pulses are 2+ on the right side and 2+ on the left side.      Heart sounds: Murmur heard.     No friction rub. No gallop.   Pulmonary:      Effort: Pulmonary effort is normal. No respiratory distress.      Breath sounds: Normal breath sounds. No wheezing or rales.   Abdominal:      General: Bowel sounds are normal.      Palpations: Abdomen is soft.   Musculoskeletal:         General: No swelling. Normal range of motion.      Cervical back: Neck supple.   Skin:     General: Skin is warm " and dry.      Capillary Refill: Capillary refill takes less than 2 seconds.      Findings: No rash.   Neurological:      Mental Status: She is alert and oriented to person, place, and time.   Psychiatric:         Speech: Speech normal.         Behavior: Behavior normal.         Thought Content: Thought content normal.         Judgment: Judgment normal.         Procedure     ECG 12 Lead    Date/Time: 3/28/2022 1:30 AM  Performed by: Ramiro Brito APRN  Authorized by: Ramiro Brito APRN   Rhythm: sinus rhythm  Rate: normal  BPM: 60  QRS axis: normal  Other findings: non-specific ST-T wave changes  Comments:  ms  No acute changes               Assessment/Plan    Diagnosis Plan   1. Epigastric pain  Holter Monitor - 24 Hour   2. Essential hypertension  Holter Monitor - 24 Hour   3. Palpitations  Holter Monitor - 24 Hour   4. Chest pressure  Holter Monitor - 24 Hour    isosorbide mononitrate (IMDUR) 30 MG 24 hr tablet   5. Family history of abdominal aortic aneurysm (AAA)  Holter Monitor - 24 Hour   1.  Patient's blood pressure is controlled on current blood pressure medication regimen.  No medication changes are warranted at this time.  Patient advised to monitor blood pressure on a daily basis and report any persistent highs or lows.  Set goal blood pressure for patient at 130/80 or below.  2.  Due to patient's episode of heart racing and near syncope I would like for her to wear a Holter monitor for 14 days to help determine the etiology of her near syncope.  I do believe that is most likely associated with nitroglycerin.  3. Stress test and echocardiogram as well as an abdominal ultrasound has been previously ordered for ischemic evaluation.  Patient will move forth with these for evaluation of ischemia.  4.  To the fact that nitroglycerin did help to relieve her chest pain.  We will start patient on isosorbide 30 mg daily.  5.  Informed of signs and symptoms of ACS and advised to seek emergent treatment  for any new worsening symptoms.  Patient also advised sooner follow-up as needed.  Also advised to follow-up with family doctor as needed  This note is dictated utilizing voice recognition software.  Although this record has been proof read, transcriptional errors may still be present. If questions occur regarding the content of this record please do not hesitate to call our office.  I have reviewed and confirmed the accuracy of the ROS as documented by the MA/JOSEN/RN TRACI Chand    No follow-ups on file.    Diagnoses and all orders for this visit:    1. Epigastric pain (Primary)  -     Holter Monitor - 24 Hour; Future    2. Essential hypertension  -     Holter Monitor - 24 Hour; Future    3. Palpitations  -     Holter Monitor - 24 Hour; Future    4. Chest pressure  -     Holter Monitor - 24 Hour; Future  -     Discontinue: isosorbide mononitrate (IMDUR) 30 MG 24 hr tablet; Take 1 tablet by mouth Daily.  Dispense: 30 tablet; Refill: 11  -     isosorbide mononitrate (IMDUR) 30 MG 24 hr tablet; Take 1 tablet by mouth Daily.  Dispense: 30 tablet; Refill: 11    5. Family history of abdominal aortic aneurysm (AAA)  -     Holter Monitor - 24 Hour; Future        Mila Hooper  reports that she has never smoked. She has never used smokeless tobacco.           MEDS ORDERED DURING VISIT:  New Medications Ordered This Visit   Medications   • isosorbide mononitrate (IMDUR) 30 MG 24 hr tablet     Sig: Take 1 tablet by mouth Daily.     Dispense:  30 tablet     Refill:  11           This document has been electronically signed by TRACI Chand Jr.  March 28, 2022 01:24 EDT

## 2022-03-29 ENCOUNTER — TELEPHONE (OUTPATIENT)
Dept: CARDIOLOGY | Facility: CLINIC | Age: 67
End: 2022-03-29

## 2022-03-29 NOTE — TELEPHONE ENCOUNTER
Called patient with results of CTA chest, no aneurysm or dissection. She reports her BP running higher in the evening but she is hesitant taking Clonidine as she takes Atenolol about 10 or 11 pm. Advised she take Atenolol earlier in the evening and monitor BP 1-2 hours after meds. She will call with BP readings. Aleida Garrett MA

## 2022-03-31 ENCOUNTER — TELEPHONE (OUTPATIENT)
Dept: CARDIOLOGY | Facility: CLINIC | Age: 67
End: 2022-03-31

## 2022-03-31 DIAGNOSIS — I10 ESSENTIAL HYPERTENSION: ICD-10-CM

## 2022-03-31 LAB
BH CV ECHO MEAS - DIST REN A EDV LEFT: 26.6 CM/S
BH CV ECHO MEAS - DIST REN A PSV LEFT: 65.3 CM/S
BH CV ECHO MEAS - MID REN A EDV LEFT: 26.9 CM/S
BH CV ECHO MEAS - MID REN A PSV LEFT: 103.3 CM/S
BH CV ECHO MEAS - PROX REN A EDV LEFT: 34 CM/S
BH CV ECHO MEAS - PROX REN A PSV LEFT: 97.4 CM/S
BH CV VAS KIDNEY HEIGHT LEFT: 5.2 CM
BH CV VAS RENAL AORTIC MID EDV: 13 CM/S
BH CV VAS RENAL AORTIC MID PSV: 68 CM/S
BH CV XLRA MEAS - KID L LEFT: 11.3 CM
BH CV XLRA MEAS DIST REN A EDV RIGHT: 22.2 CM/S
BH CV XLRA MEAS DIST REN A PSV RIGHT: 64.2 CM/S
BH CV XLRA MEAS KID H RIGHT: 4.1 CM
BH CV XLRA MEAS KID L RIGHT: 9.3 CM
BH CV XLRA MEAS KID W RIGHT: 5.4 CM
BH CV XLRA MEAS MID REN A EDV RIGHT: 39 CM/S
BH CV XLRA MEAS MID REN A PSV RIGHT: 113.3 CM/S
BH CV XLRA MEAS PROX REN A EDV RIGHT: 32.3 CM/S
BH CV XLRA MEAS PROX REN A PSV RIGHT: 117.1 CM/S
BH CV XLRA MEAS RAR LEFT: 1.7
BH CV XLRA MEAS RAR RIGHT: 1.7
BH CV XLRA MEAS RENAL A ORG EDV LEFT: 37.8 CM/S
BH CV XLRA MEAS RENAL A ORG EDV RIGHT: 42.5 CM/S
BH CV XLRA MEAS RENAL A ORG PSV LEFT: 113.4 CM/S
BH CV XLRA MEAS RENAL A ORG PSV RIGHT: 137 CM/S
LEFT KIDNEY WIDTH: 4.4 CM
MAXIMAL PREDICTED HEART RATE: 153 BPM
STRESS TARGET HR: 130 BPM

## 2022-03-31 RX ORDER — AMLODIPINE BESYLATE 5 MG/1
TABLET ORAL
Qty: 30 TABLET | Refills: 0 | Status: SHIPPED | OUTPATIENT
Start: 2022-03-31 | End: 2022-05-05

## 2022-04-01 ENCOUNTER — TELEPHONE (OUTPATIENT)
Dept: CARDIOLOGY | Facility: CLINIC | Age: 67
End: 2022-04-01

## 2022-04-01 DIAGNOSIS — R93.89 ABNORMAL COMPUTED TOMOGRAPHY ANGIOGRAPHY (CTA): Primary | ICD-10-CM

## 2022-04-01 DIAGNOSIS — R06.02 SHORTNESS OF BREATH: ICD-10-CM

## 2022-04-01 NOTE — TELEPHONE ENCOUNTER
Talked with patient about test results yesterday and she would like a referral to Dr Henley. She also is requesting testing reports be printed for her . I advised patient that she will need to come in and sign a request. I will do referral order today.

## 2022-04-19 ENCOUNTER — APPOINTMENT (OUTPATIENT)
Dept: CARDIOLOGY | Facility: HOSPITAL | Age: 67
End: 2022-04-19

## 2022-04-19 ENCOUNTER — TELEPHONE (OUTPATIENT)
Dept: CARDIOLOGY | Facility: CLINIC | Age: 67
End: 2022-04-19

## 2022-04-19 DIAGNOSIS — I47.29 NSVT (NONSUSTAINED VENTRICULAR TACHYCARDIA): Primary | ICD-10-CM

## 2022-04-19 DIAGNOSIS — R53.82 CHRONIC FATIGUE: ICD-10-CM

## 2022-04-19 RX ORDER — METOPROLOL SUCCINATE 50 MG/1
50 TABLET, EXTENDED RELEASE ORAL DAILY
Qty: 30 TABLET | Refills: 11 | Status: SHIPPED | OUTPATIENT
Start: 2022-04-19 | End: 2022-07-18 | Stop reason: SDUPTHER

## 2022-04-19 NOTE — TELEPHONE ENCOUNTER
Critical- Ventricular Tachy run 20 beats, at 135 BPM occurred on 03/25/22 @7:51pm    Critical EKG given to Provider.

## 2022-04-20 ENCOUNTER — TELEPHONE (OUTPATIENT)
Dept: CARDIOLOGY | Facility: CLINIC | Age: 67
End: 2022-04-20

## 2022-04-20 ENCOUNTER — LAB (OUTPATIENT)
Dept: LAB | Facility: HOSPITAL | Age: 67
End: 2022-04-20

## 2022-04-20 DIAGNOSIS — I47.29 NSVT (NONSUSTAINED VENTRICULAR TACHYCARDIA): ICD-10-CM

## 2022-04-20 DIAGNOSIS — R53.82 CHRONIC FATIGUE: ICD-10-CM

## 2022-04-20 LAB
ALBUMIN SERPL-MCNC: 4.3 G/DL (ref 3.5–5.2)
ALBUMIN/GLOB SERPL: 1.9 G/DL
ALP SERPL-CCNC: 57 U/L (ref 39–117)
ALT SERPL W P-5'-P-CCNC: 17 U/L (ref 1–33)
ANION GAP SERPL CALCULATED.3IONS-SCNC: 11 MMOL/L (ref 5–15)
AST SERPL-CCNC: 22 U/L (ref 1–32)
BASOPHILS # BLD AUTO: 0.06 10*3/MM3 (ref 0–0.2)
BASOPHILS NFR BLD AUTO: 1.2 % (ref 0–1.5)
BILIRUB SERPL-MCNC: 0.6 MG/DL (ref 0–1.2)
BUN SERPL-MCNC: 13 MG/DL (ref 8–23)
BUN/CREAT SERPL: 14.8 (ref 7–25)
CALCIUM SPEC-SCNC: 9.6 MG/DL (ref 8.6–10.5)
CHLORIDE SERPL-SCNC: 103 MMOL/L (ref 98–107)
CO2 SERPL-SCNC: 23 MMOL/L (ref 22–29)
CREAT SERPL-MCNC: 0.88 MG/DL (ref 0.57–1)
DEPRECATED RDW RBC AUTO: 42.7 FL (ref 37–54)
EGFRCR SERPLBLD CKD-EPI 2021: 72.1 ML/MIN/1.73
EOSINOPHIL # BLD AUTO: 0.05 10*3/MM3 (ref 0–0.4)
EOSINOPHIL NFR BLD AUTO: 1 % (ref 0.3–6.2)
ERYTHROCYTE [DISTWIDTH] IN BLOOD BY AUTOMATED COUNT: 12.4 % (ref 12.3–15.4)
GLOBULIN UR ELPH-MCNC: 2.3 GM/DL
GLUCOSE SERPL-MCNC: 98 MG/DL (ref 65–99)
HCT VFR BLD AUTO: 37.7 % (ref 34–46.6)
HGB BLD-MCNC: 12.3 G/DL (ref 12–15.9)
IMM GRANULOCYTES # BLD AUTO: 0.01 10*3/MM3 (ref 0–0.05)
IMM GRANULOCYTES NFR BLD AUTO: 0.2 % (ref 0–0.5)
LYMPHOCYTES # BLD AUTO: 2.41 10*3/MM3 (ref 0.7–3.1)
LYMPHOCYTES NFR BLD AUTO: 47.3 % (ref 19.6–45.3)
MAGNESIUM SERPL-MCNC: 2.1 MG/DL (ref 1.6–2.4)
MCH RBC QN AUTO: 30.5 PG (ref 26.6–33)
MCHC RBC AUTO-ENTMCNC: 32.6 G/DL (ref 31.5–35.7)
MCV RBC AUTO: 93.5 FL (ref 79–97)
MONOCYTES # BLD AUTO: 0.56 10*3/MM3 (ref 0.1–0.9)
MONOCYTES NFR BLD AUTO: 11 % (ref 5–12)
NEUTROPHILS NFR BLD AUTO: 2 10*3/MM3 (ref 1.7–7)
NEUTROPHILS NFR BLD AUTO: 39.3 % (ref 42.7–76)
NRBC BLD AUTO-RTO: 0 /100 WBC (ref 0–0.2)
PLATELET # BLD AUTO: 230 10*3/MM3 (ref 140–450)
PMV BLD AUTO: 10.5 FL (ref 6–12)
POTASSIUM SERPL-SCNC: 4.7 MMOL/L (ref 3.5–5.2)
PROT SERPL-MCNC: 6.6 G/DL (ref 6–8.5)
RBC # BLD AUTO: 4.03 10*6/MM3 (ref 3.77–5.28)
SODIUM SERPL-SCNC: 137 MMOL/L (ref 136–145)
TSH SERPL DL<=0.05 MIU/L-ACNC: 1.52 UIU/ML (ref 0.27–4.2)
WBC NRBC COR # BLD: 5.09 10*3/MM3 (ref 3.4–10.8)

## 2022-04-20 PROCEDURE — 84443 ASSAY THYROID STIM HORMONE: CPT

## 2022-04-20 PROCEDURE — 80053 COMPREHEN METABOLIC PANEL: CPT

## 2022-04-20 PROCEDURE — 83735 ASSAY OF MAGNESIUM: CPT

## 2022-04-20 PROCEDURE — 36415 COLL VENOUS BLD VENIPUNCTURE: CPT

## 2022-04-20 PROCEDURE — 85025 COMPLETE CBC W/AUTO DIFF WBC: CPT

## 2022-04-20 NOTE — TELEPHONE ENCOUNTER
Called patient advised of Holter results and changes in medication. Ramiro Brito NP stopped atenolol and added Metoprolol Succ 50 mg po qd . He also ordered lab work as well.   Fany LOPEZA

## 2022-04-22 ENCOUNTER — TELEPHONE (OUTPATIENT)
Dept: CARDIOLOGY | Facility: CLINIC | Age: 67
End: 2022-04-22

## 2022-04-22 NOTE — TELEPHONE ENCOUNTER
LABS  Pt notified of no acute findings. Provider will discuss results at f/u. Pt reminded of appt date and time.    ----- Message from Mariel Gonzalez MA sent at 4/22/2022 12:03 PM EDT -----    ----- Message -----  From: Ramiro Brito APRN  Sent: 4/22/2022   8:26 AM EDT  To: Mariel Gonzalez MA    Labs are relatively within normal limits.  Keep follow-up.  ----- Message -----  From: Lab, Background User  Sent: 4/20/2022   7:49 PM EDT  To: TRACI Chand

## 2022-05-05 DIAGNOSIS — I10 ESSENTIAL HYPERTENSION: ICD-10-CM

## 2022-05-05 RX ORDER — AMLODIPINE BESYLATE 5 MG/1
TABLET ORAL
Qty: 30 TABLET | Refills: 0 | Status: SHIPPED | OUTPATIENT
Start: 2022-05-05 | End: 2022-06-20 | Stop reason: SDUPTHER

## 2022-06-04 DIAGNOSIS — I10 ESSENTIAL HYPERTENSION: ICD-10-CM

## 2022-06-06 RX ORDER — CLONIDINE HYDROCHLORIDE 0.1 MG/1
0.1 TABLET ORAL 3 TIMES DAILY PRN
Qty: 270 TABLET | Refills: 1 | Status: SHIPPED | OUTPATIENT
Start: 2022-06-06

## 2022-06-20 ENCOUNTER — TELEPHONE (OUTPATIENT)
Dept: CARDIOLOGY | Facility: CLINIC | Age: 67
End: 2022-06-20

## 2022-06-20 DIAGNOSIS — I10 ESSENTIAL HYPERTENSION: Primary | ICD-10-CM

## 2022-06-20 DIAGNOSIS — R07.89 CHEST PRESSURE: ICD-10-CM

## 2022-06-20 RX ORDER — ISOSORBIDE MONONITRATE 60 MG/1
60 TABLET, EXTENDED RELEASE ORAL EVERY MORNING
Qty: 30 TABLET | Refills: 11 | Status: SHIPPED | OUTPATIENT
Start: 2022-06-20 | End: 2022-07-08

## 2022-06-20 RX ORDER — AMLODIPINE BESYLATE 10 MG/1
10 TABLET ORAL DAILY
Qty: 30 TABLET | Refills: 11 | Status: SHIPPED | OUTPATIENT
Start: 2022-06-20 | End: 2022-06-20 | Stop reason: SINTOL

## 2022-06-20 RX ORDER — ATENOLOL 50 MG/1
50 TABLET ORAL 2 TIMES DAILY
COMMUNITY
End: 2022-06-24

## 2022-06-20 NOTE — TELEPHONE ENCOUNTER
----- Message from TRACI Chand sent at 6/20/2022  8:27 AM EDT -----  Patient notified Chela that she was having chest pain and persistent hypertension with systolics greater than 140 and diastolics greater than 90.  Therefore I would like to increase her amlodipine to 10 mg daily for her blood pressure and increase her isosorbide to 60 mg daily for her chest pain.  She did have a negative stress test back in March of this year.  We please notify patient of these changes.    Called patient to advise of above. She states that she could not take Amlodipine  and stopped it. She says that she is taking Atenolol 50 mg po bid . I will ask provider what he would like to do.

## 2022-06-21 NOTE — TELEPHONE ENCOUNTER
The PM called to obtain clarification regarding her medications as discussed with JR.  Pt reports she is taking the Atenolol and Toprol XL at different times of the day.  The PM explained to the pt that JR discontinued the Atenolol and started her on Toprol XL due to her cardiac monitor results.  Pt instructed not to take both.  Pt states she cannot take the amlodipine due to making her feel horrible.  Pt stated she will pick her medication up on Friday and start it on Saturday due to [place].  The PM reiterated the instructions for her Clonidine PRN >160/90 that is in her chart.  Pt reports her bottle says >160/100.  Pharmacy info updated as requested by the pt.  Pt placed on the wait list and will be called if  has a cancellation.  The PM explained to the pt that she needs to decide if she wants her PCP or our office to adjust her B/P meds to prevent confusion for her and potential medication errors.  Pt stated she wants our office to address her b/p issues.

## 2022-07-08 DIAGNOSIS — I10 ESSENTIAL HYPERTENSION: Primary | ICD-10-CM

## 2022-07-08 DIAGNOSIS — R07.89 CHEST PRESSURE: ICD-10-CM

## 2022-07-08 RX ORDER — ISOSORBIDE MONONITRATE 60 MG/1
60 TABLET, EXTENDED RELEASE ORAL DAILY
COMMUNITY
End: 2022-10-27 | Stop reason: SDUPTHER

## 2022-07-08 RX ORDER — ISOSORBIDE MONONITRATE 60 MG/1
60 TABLET, EXTENDED RELEASE ORAL EVERY MORNING
Qty: 30 TABLET | Refills: 11 | Status: CANCELLED | OUTPATIENT
Start: 2022-07-08

## 2022-07-08 RX ORDER — ACETAMINOPHEN 500 MG
500 TABLET ORAL EVERY 6 HOURS PRN
COMMUNITY

## 2022-07-09 DIAGNOSIS — R07.89 CHEST PRESSURE: ICD-10-CM

## 2022-07-11 RX ORDER — NITROGLYCERIN 0.4 MG/1
TABLET SUBLINGUAL
Qty: 75 TABLET | Refills: 1 | Status: SHIPPED | OUTPATIENT
Start: 2022-07-11 | End: 2022-10-27 | Stop reason: SDUPTHER

## 2022-07-18 ENCOUNTER — TELEPHONE (OUTPATIENT)
Dept: CARDIOLOGY | Facility: CLINIC | Age: 67
End: 2022-07-18

## 2022-07-18 DIAGNOSIS — I47.29 NSVT (NONSUSTAINED VENTRICULAR TACHYCARDIA): ICD-10-CM

## 2022-07-18 RX ORDER — METOPROLOL SUCCINATE 50 MG/1
50 TABLET, EXTENDED RELEASE ORAL DAILY
Qty: 30 TABLET | Refills: 11 | Status: SHIPPED | OUTPATIENT
Start: 2022-07-18 | End: 2022-10-27 | Stop reason: SDUPTHER

## 2022-07-18 NOTE — TELEPHONE ENCOUNTER
Pt sent a message requesting a refill for her metoprolol to be sent to [facility] due to her inability to obtain them before she runs out.  She will need a 30 day supply to get her through until her appt the first week of August.

## 2022-10-27 DIAGNOSIS — I47.29 NSVT (NONSUSTAINED VENTRICULAR TACHYCARDIA): ICD-10-CM

## 2022-10-27 DIAGNOSIS — R07.89 CHEST PRESSURE: ICD-10-CM

## 2022-10-27 RX ORDER — ISOSORBIDE MONONITRATE 60 MG/1
60 TABLET, EXTENDED RELEASE ORAL DAILY
Qty: 90 TABLET | Refills: 3 | Status: SHIPPED | OUTPATIENT
Start: 2022-10-27

## 2022-10-27 RX ORDER — METOPROLOL SUCCINATE 50 MG/1
50 TABLET, EXTENDED RELEASE ORAL DAILY
Qty: 90 TABLET | Refills: 3 | Status: SHIPPED | OUTPATIENT
Start: 2022-10-27

## 2022-10-27 RX ORDER — NITROGLYCERIN 0.4 MG/1
TABLET SUBLINGUAL
Qty: 25 TABLET | Refills: 3 | Status: SHIPPED | OUTPATIENT
Start: 2022-10-27

## 2023-10-17 ENCOUNTER — TELEPHONE (OUTPATIENT)
Dept: CARDIOLOGY | Facility: CLINIC | Age: 68
End: 2023-10-17
Payer: MEDICARE

## 2023-10-17 NOTE — TELEPHONE ENCOUNTER
"    Caller: Mila Hooper \"ADAIR\"    Relationship to patient: Self    Best call back number: [phone]    Chief complaint:     Type of visit:FOLLOW UP      Requested date: ANY DAY BUT FRIDAYS     If rescheduling, when is the original appointment: 10-18-23     Additional notes:PATIENT LAST IN OFFICE NOV. 2022 PATIENT NEEDS FOLLOW UP APPOINTMENT. PLEASE REACH OUT TO PATIENT TO SCHEDULE           "

## 2024-05-20 ENCOUNTER — TELEPHONE (OUTPATIENT)
Dept: CARDIOLOGY | Facility: CLINIC | Age: 69
End: 2024-05-20

## 2024-05-20 NOTE — TELEPHONE ENCOUNTER
"Caller: Mila Hooper \"ADAIR\"    Relationship to patient: Self    Best call back number: [phone]    Chief complaint: QUESTIONS ABOUT PREVIOUS TESTING    Type of visit: FOLLOW UP    Requested date: ANY     If rescheduling, when is the original appointment: 5.20.24     Additional notes:HUB AS NO AVAILABILITY WITH DR. VEGA FOR A YEAR.   "

## 2024-06-25 ENCOUNTER — TELEPHONE (OUTPATIENT)
Dept: CARDIOLOGY | Facility: CLINIC | Age: 69
End: 2024-06-25
Payer: OTHER GOVERNMENT

## 2024-06-25 NOTE — TELEPHONE ENCOUNTER
Per chart review, pt was last seen early 2022, so we can't order anything until she's seen.       Called and informed pt of the above, added to waitlist

## 2024-06-25 NOTE — TELEPHONE ENCOUNTER
"    Caller: Mila Hooper \"ADAIR\"    Relationship to patient: Self    Best call back number: [phone]    Chief complaint:     Type of visit: FOLLOW UP    Requested date:      If rescheduling, when is the original appointment: 10-16-24     Additional notes:PATIENT CALLED IN TO RESCHEDULE APPOINTMENT FROM 6-26-24. FIRST AVAILABLE WAS NOT UNTIL 10-16-24 . IF PATIENT NEEDS TO BE SEEN SOONER, PLEASE REACH OUT.  PATIENT ALSO STATES SHE WOULD LIKE TO BE SCHEDULED FOR A STRESS TEST DUE TO BEING EXHAUSTED ALL THE TIME. PATIENT SAYS SHE FEELS SOMETHING RUSHING UP TO HER FACE UP HER NECK.           "

## 2024-10-10 ENCOUNTER — TELEPHONE (OUTPATIENT)
Dept: CARDIOLOGY | Facility: CLINIC | Age: 69
End: 2024-10-10
Payer: OTHER GOVERNMENT

## 2024-12-15 NOTE — PROGRESS NOTES
[facility]     Office Visit       Date: 12/16/2024   Patient Name: Mila Hooper  MRN: [id number]  YOB: 1955    Referring Physician: No ref. provider found     Chief Complaint:   Chief Complaint   Patient presents with    Left Wrist - Pain, Initial Evaluation     DOI 12/03/2024     History of Present Illness:   Mila Hooper is a 69 y.o. female right-hand-dominant presented clinic as a new patient with complaints of left wrist pain.  The patient sustained a fall on 12/3/2024 injuring her left wrist.  He presented to an outside hospital where x-rays were obtained.  There was no evidence of acute fracture.  She was placed in a removable splint and instructed to follow-up.  She has had a complicated history regarding her left wrist.  She had an open reduction internal fixation of a distal radius fracture in 2013.  In 2006 she underwent third ray resection.  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  She denies any numbness or tingling.  No other complaints or concerns.    PMH: HTN, hypercholesteremia.     Subjective   Review of Systems:   Review of Systems   Constitutional:  Negative for chills, fever, unexpected weight gain and unexpected weight loss.   HENT:  Negative for congestion, postnasal drip and rhinorrhea.    Eyes:  Negative for blurred vision.   Respiratory:  Negative for shortness of breath.    Cardiovascular:  Negative for leg swelling.   Gastrointestinal:  Negative for abdominal pain, nausea and vomiting.   Genitourinary:  Negative for difficulty urinating.   Musculoskeletal:  Positive for arthralgias. Negative for gait problem, joint swelling and myalgias.   Skin:  Negative for skin lesions and wound.   Neurological:  Negative for dizziness, weakness, light-headedness and numbness.   Hematological:  Does not bruise/bleed easily.   Psychiatric/Behavioral:  Negative for  "depressed mood.       Pertinent review of systems per HPI    I reviewed the patient's chief complaint, history of present illness, review of systems, past medical history, surgical history, family history, social history, medications and allergy list in the EMR on 12/16/2024 and agree with the findings above.    Objective    Vital Signs:   Vitals:    12/16/24 0938   BP: 130/74   Weight: 75.2 kg (165 lb 12.8 oz)   Height: 170.2 cm (67.01\")     BMI:      General: No acute distress. Alert and oriented.   Cardiovascular: Palpable radial pulse.   Respiratory: Breathing is nonlabored.     Ortho Exam:  Examination of left upper extremity demonstrates third ray resection.  Nodule formation at the index finger MCP joint.  Well-healed surgical vision overlying the distal radius.  She is diffusely tender to palpation all points during examination including the thumb CMC joint, distal radius, first extensor compartment, less so at the anatomic snuffbox and dorsal SL interval.  She has painless wrist range of motion.  Mild pain noted with index finger MCP joint grind.  She is able to make a composite fist.  Sensations intact light touch at the hand.  Warm and well-perfused actively.    Imaging / Studies:    Imaging Results (Last 24 Hours)       Procedure Component Value Units Date/Time    XR Wrist 3+ View Left [[id number]] Resulted: 12/16/24 1001     Updated: 12/16/24 1001    Narrative:      Left Wrist X-Ray    Indication: s/p ORIF    Views:  AP, Lateral, and Oblique     Comparison: None    Findings:  Patient is status post ORIF of the distal radius. The hardware is intact.    Normal soft tissues. Normal joint spaces. Alignment appears acceptable.    No acute fractures.  Status post third metacarpal ray resection.    Impression: Status post distal radius ORIF and third metacarpal ray   resection            Assessment / Plan    Assessment/Plan:   Mila Hooper is a 69 y.o. female with a left wrist sprain status post fall, " DOI 12/30/2024.    I discussed with the patient their clinical and radiographic findings demonstrate no evidence of acute fracture with symptoms of a left wrist sprain not otherwise specified.  We had a lengthy discussion regarding the pathophysiology of their diagnosis.  I reviewed the patient her radiographs and relevant anatomy.  I do not appreciate any acute fractures on x-ray.  Her exam demonstrates tenderness in all areas palpated today.  I think the patient would benefit from immobilization with a wrist splint and a course of prednisone.  I would like to see her back in 2 weeks for reevaluation and repeat exam. They were agreeable with the plan.  All questions and concerns were addressed.       ICD-10-CM ICD-9-CM   1. Wrist sprain, left, subsequent encounter  S63.502D V58.89     842.00   2. Left wrist pain  M25.532 719.43     Follow Up:   Return in about 2 weeks (around 12/30/2024) for Follow Up.      Kayleigh Sierra MD  [facility]

## 2024-12-16 ENCOUNTER — OFFICE VISIT (OUTPATIENT)
Dept: ORTHOPEDIC SURGERY | Facility: CLINIC | Age: 69
End: 2024-12-16
Payer: OTHER GOVERNMENT

## 2024-12-16 VITALS
SYSTOLIC BLOOD PRESSURE: 130 MMHG | HEIGHT: 67 IN | DIASTOLIC BLOOD PRESSURE: 74 MMHG | BODY MASS INDEX: 26.02 KG/M2 | WEIGHT: 165.8 LBS

## 2024-12-16 DIAGNOSIS — S63.502D WRIST SPRAIN, LEFT, SUBSEQUENT ENCOUNTER: Primary | ICD-10-CM

## 2024-12-16 DIAGNOSIS — M25.532 LEFT WRIST PAIN: ICD-10-CM

## 2024-12-16 PROCEDURE — 99203 OFFICE O/P NEW LOW 30 MIN: CPT | Performed by: STUDENT IN AN ORGANIZED HEALTH CARE EDUCATION/TRAINING PROGRAM

## 2024-12-16 RX ORDER — FAMOTIDINE 20 MG/1
20 TABLET, FILM COATED ORAL
COMMUNITY

## 2024-12-16 RX ORDER — HYDROXYZINE HYDROCHLORIDE 50 MG/1
TABLET, FILM COATED ORAL
COMMUNITY
Start: 2024-09-24

## 2024-12-16 RX ORDER — METHYLPREDNISOLONE 4 MG/1
1 TABLET ORAL DAILY
Qty: 21 TABLET | Refills: 0 | Status: SHIPPED | OUTPATIENT
Start: 2024-12-16